# Patient Record
Sex: MALE | Race: WHITE | NOT HISPANIC OR LATINO | URBAN - METROPOLITAN AREA
[De-identification: names, ages, dates, MRNs, and addresses within clinical notes are randomized per-mention and may not be internally consistent; named-entity substitution may affect disease eponyms.]

---

## 2020-07-18 ENCOUNTER — EMERGENCY (EMERGENCY)
Facility: HOSPITAL | Age: 40
LOS: 1 days | Discharge: HOME | End: 2020-07-18
Attending: EMERGENCY MEDICINE | Admitting: EMERGENCY MEDICINE
Payer: MEDICARE

## 2020-07-18 VITALS
DIASTOLIC BLOOD PRESSURE: 76 MMHG | HEART RATE: 72 BPM | RESPIRATION RATE: 19 BRPM | OXYGEN SATURATION: 97 % | WEIGHT: 149.91 LBS | TEMPERATURE: 98 F | SYSTOLIC BLOOD PRESSURE: 133 MMHG | HEIGHT: 67 IN

## 2020-07-18 PROCEDURE — U0003: CPT

## 2020-07-18 PROCEDURE — 99053 MED SERV 10PM-8AM 24 HR FAC: CPT

## 2020-07-18 PROCEDURE — 99284 EMERGENCY DEPT VISIT MOD MDM: CPT

## 2020-07-18 PROCEDURE — 99283 EMERGENCY DEPT VISIT LOW MDM: CPT

## 2020-07-18 RX ORDER — CIPROFLOXACIN HCL 0.3 %
1 DROPS OPHTHALMIC (EYE) ONCE
Refills: 0 | Status: COMPLETED | OUTPATIENT
Start: 2020-07-18 | End: 2020-07-18

## 2020-07-18 RX ORDER — CIPROFLOXACIN HCL 0.3 %
2 DROPS OPHTHALMIC (EYE)
Qty: 5 | Refills: 0
Start: 2020-07-18 | End: 2020-07-22

## 2020-07-18 RX ADMIN — Medication 1 DROP(S): at 07:55

## 2020-07-18 NOTE — ED PROVIDER NOTE - NSFOLLOWUPCLINICS_GEN_ALL_ED_FT
[facility], [place] - Ophthalmology Clinic  Ophthalmology  [address], [address]  [place], [place] [address]  Phone: [phone]  Fax:   Follow Up Time:

## 2020-07-18 NOTE — ED ADULT NURSE NOTE - OBJECTIVE STATEMENT
pt states "I woke up and I noticed my left eye is red, swollen and tearing. It also hurt a little". Denies trauma. Denies vision changes  significant erythema/edema/tearing noted,  states already removed contacts, also requesting Covid test

## 2020-07-18 NOTE — ED PROVIDER NOTE - NSFOLLOWUPINSTRUCTIONS_ED_ALL_ED_FT
Conjunctivitis    WHAT YOU NEED TO KNOW:    Conjunctivitis, or pink eye, is inflammation of your conjunctiva. The conjunctiva is a thin tissue that covers the front of your eye and the back of your eyelids. The conjunctiva helps protect your eye and keep it moist. Conjunctivitis may be caused by bacteria, allergies, or a virus. If your conjunctivitis is caused by bacteria, it may get better on its own in about 7 days. Viral conjunctivitis can last up to 3 weeks.     DISCHARGE INSTRUCTIONS:    Return to the emergency department if:     You have worsening eye pain.       The swelling in your eye gets worse, even after treatment.       Your vision suddenly becomes worse or you cannot see at all.    Contact your healthcare provider if:     You develop a fever and ear pain.      You have tiny bumps or spots of blood on your eye.      You have questions or concerns about your condition or care.    Manage your symptoms:     Apply a cool compress. Wet a washcloth with cold water and place it on your eye. This will help decrease itching and irritation.      Do not wear contact lenses. They can irritate your eye. Throw away the pair you are using and ask when you can wear them again. Use a new pair of lenses when your healthcare provider says it is okay.       Avoid irritants. Stay away from smoke filled areas. Shield your eyes from wind and sun.       Flush your eye. You may need to flush your eye with saline to help decrease your symptoms. Ask for more information on how to flush your eye.     Medicines: Treatment depends on what is causing your conjunctivitis. You maybe given any of the following:    Allergy medicine helps decrease itchy, red, swollen eyes caused by allergies. It may be given as a pill, eye drops, or nasal spray.      Antibiotics may be needed if your conjunctivitis is caused by bacteria. This medicine may be given as a pill, eye drops, or eye ointment.      Take your medicine as directed. Contact your healthcare provider if you think your medicine is not helping or if you have side effects. Tell him or her if you are allergic to any medicine. Keep a list of the medicines, vitamins, and herbs you take. Include the amounts, and when and why you take them. Bring the list or the pill bottles to follow-up visits. Carry your medicine list with you in case of an emergency.    Prevent the spread of conjunctivitis:     Wash your hands with soap and water often. Wash your hands before and after you touch your eyes. Also wash your hands before you prepare or eat food and after you use the bathroom or change a diaper.      Avoid allergens. Try to avoid the things that cause your allergies, such as pets, dust, or grass.       Avoid contact with others. Do not share towels or washcloths. Try to stay away from others as much as possible. Ask when you can return to work or school.       Throw away eye makeup. The bacteria that caused your conjunctivitis can stay in eye makeup. Throw away mascara and other eye makeup.

## 2020-07-18 NOTE — ED PROVIDER NOTE - OBJECTIVE STATEMENT
41 y/o m contact lense wearer presents with left eye redness, irritation and discharge today.  Pt reports mild discomfort to left eye yesterday, didn't put contact lenses in as a result and then woke up today with sx significantly worsening.  Pt reports no change in vision.  Denies photophobia, foreign body, all other ROS negative.

## 2020-07-18 NOTE — ED PROVIDER NOTE - ATTENDING CONTRIBUTION TO CARE
Pt w conjunctivitis L eye - discussed abx treatment, reasons for return, eye care.  Pt to fu ophtho.

## 2020-07-18 NOTE — ED ADULT TRIAGE NOTE - CHIEF COMPLAINT QUOTE
pt states "I woke up and I noticed my left eye is red, swollen and tearing. It also hurt a little". Denies trauma. Denies vision changes

## 2020-07-18 NOTE — ED PROVIDER NOTE - PATIENT PORTAL LINK FT
You can access the FollowMyHealth Patient Portal offered by [facility] by registering at the following website: [email or web address]. By joining [employer]’s FollowMyHealth portal, you will also be able to view your health information using other applications (apps) compatible with our system.

## 2020-07-18 NOTE — ED ADULT NURSE REASSESSMENT NOTE - NS ED NURSE REASSESS COMMENT FT1
medicated as noted, fidel. well, provided with tissues for blotting/comfort, awaiting dispo. papers, understanding verbalized

## 2020-07-18 NOTE — ED PROVIDER NOTE - CLINICAL SUMMARY MEDICAL DECISION MAKING FREE TEXT BOX
41 y/o m presents with left eye conjunctivitis; fluorescein stain neg, given cipro drops as pt is contact lense wearer.  Pt to f/u with ophthalmology

## 2020-07-19 LAB — SARS-COV-2 RNA SPEC QL NAA+PROBE: SIGNIFICANT CHANGE UP

## 2020-07-22 DIAGNOSIS — Z20.828 CONTACT WITH AND (SUSPECTED) EXPOSURE TO OTHER VIRAL COMMUNICABLE DISEASES: ICD-10-CM

## 2020-07-22 DIAGNOSIS — H57.89 OTHER SPECIFIED DISORDERS OF EYE AND ADNEXA: ICD-10-CM

## 2020-07-22 DIAGNOSIS — H10.9 UNSPECIFIED CONJUNCTIVITIS: ICD-10-CM

## 2020-12-24 NOTE — ED ADULT NURSE NOTE - CHPI ED NUR SYMPTOMS POS
BLURRED VISION/PAIN/PHOTOPHOBIA/EYE LID SWELLING/INFLAMMATION/ITCHING/DISCHARGE/DRAINAGE/REDNESS Skin normal color for race, warm, dry and intact. No evidence of rash.

## 2023-07-26 NOTE — ED ADULT TRIAGE NOTE - HEIGHT IN INCHES
----- Message -----  From: Addis Yee RN  Sent: 7/26/2023  10:36 AM CDT  To: Clara Jesus; Briana Campos; Danelle Collado!    I spoke with Dr. Brown about completing this form and he feels that he is not the appropriate person to fill out the form - LOV with Dr. Brown on 3/4/21. Patient saw Dr. Jorge after 3/4/21 - had 2 surgeries with him.    Thanks!  YESSI Mancini    Form already sent to Dr. Jorge.  Closed encounter.  
Forms have been faxed to Sandra Perry  
Per Dr. Brown - would recommend Dr. Jorge fills out form. LOV with Dr. Brown on 3/4/21 - patient had 2 left knee surgeries with Dr. Jorge after 3/4/21.  
Printed out for MD completion on 7/26/23.  
The form was sent to the Physician's Nurse MSG Pool via In-Basket for review and signature.    Completed form will be faxed to [employer] at [phone].    
[id number] Form was received on 7/17/23 by the Work Comp Completion Team - [facility], [address]    
7